# Patient Record
Sex: MALE | Race: BLACK OR AFRICAN AMERICAN | NOT HISPANIC OR LATINO | URBAN - METROPOLITAN AREA
[De-identification: names, ages, dates, MRNs, and addresses within clinical notes are randomized per-mention and may not be internally consistent; named-entity substitution may affect disease eponyms.]

---

## 2017-12-22 ENCOUNTER — APPOINTMENT (OUTPATIENT)
Age: 27
Setting detail: DERMATOLOGY
End: 2018-01-08

## 2017-12-22 VITALS — WEIGHT: 222 LBS | HEART RATE: 58 BPM | HEIGHT: 70 IN

## 2017-12-22 DIAGNOSIS — B36.0 PITYRIASIS VERSICOLOR: ICD-10-CM

## 2017-12-22 PROCEDURE — OTHER TREATMENT REGIMEN: OTHER

## 2017-12-22 PROCEDURE — 87220 TISSUE EXAM FOR FUNGI: CPT

## 2017-12-22 PROCEDURE — 99202 OFFICE O/P NEW SF 15 MIN: CPT

## 2017-12-22 PROCEDURE — OTHER PRESCRIPTION: OTHER

## 2017-12-22 PROCEDURE — OTHER KOH PREP: OTHER

## 2017-12-22 PROCEDURE — OTHER COUNSELING: OTHER

## 2017-12-22 RX ORDER — KETOCONAZOLE 20.5 MG/ML
SHAMPOO, SUSPENSION TOPICAL
Qty: 1 | Refills: 6 | Status: ERX | COMMUNITY
Start: 2017-12-22

## 2017-12-22 RX ORDER — FLUCONAZOLE 200 MG/1
TABLET ORAL
Qty: 4 | Refills: 0 | Status: ERX | COMMUNITY
Start: 2017-12-22

## 2017-12-22 ASSESSMENT — LOCATION DETAILED DESCRIPTION DERM
LOCATION DETAILED: LEFT INFERIOR MEDIAL MIDBACK
LOCATION DETAILED: EPIGASTRIC SKIN
LOCATION DETAILED: LEFT VENTRAL PROXIMAL FOREARM
LOCATION DETAILED: RIGHT VENTRAL PROXIMAL FOREARM

## 2017-12-22 ASSESSMENT — LOCATION SIMPLE DESCRIPTION DERM
LOCATION SIMPLE: LEFT LOWER BACK
LOCATION SIMPLE: LEFT FOREARM
LOCATION SIMPLE: RIGHT FOREARM
LOCATION SIMPLE: ABDOMEN

## 2017-12-22 ASSESSMENT — LOCATION ZONE DERM
LOCATION ZONE: ARM
LOCATION ZONE: TRUNK

## 2017-12-22 NOTE — PROCEDURE: TREATMENT REGIMEN
Initiate Treatment: Fluconazole, 200 mg tablets to be taken as directed; Ketoconazole, 2% shampoo to be used as directed
Detail Level: Zone
Plan: Patient to RTC in 6 weeks for reevaluation
Discontinue Regimen: Triamcinolone topical cream